# Patient Record
Sex: FEMALE | HISPANIC OR LATINO | Employment: FULL TIME | URBAN - METROPOLITAN AREA
[De-identification: names, ages, dates, MRNs, and addresses within clinical notes are randomized per-mention and may not be internally consistent; named-entity substitution may affect disease eponyms.]

---

## 2017-08-28 ENCOUNTER — HOSPITAL ENCOUNTER (INPATIENT)
Facility: MEDICAL CENTER | Age: 37
LOS: 2 days | End: 2017-08-30
Attending: SPECIALIST | Admitting: SPECIALIST
Payer: MEDICAID

## 2017-08-28 LAB
BASOPHILS # BLD AUTO: 0.3 % (ref 0–1.8)
BASOPHILS # BLD: 0.05 K/UL (ref 0–0.12)
EOSINOPHIL # BLD AUTO: 0.07 K/UL (ref 0–0.51)
EOSINOPHIL NFR BLD: 0.4 % (ref 0–6.9)
ERYTHROCYTE [DISTWIDTH] IN BLOOD BY AUTOMATED COUNT: 47.1 FL (ref 35.9–50)
HCT VFR BLD AUTO: 40 % (ref 37–47)
HGB BLD-MCNC: 13.9 G/DL (ref 12–16)
HOLDING TUBE BB 8507: NORMAL
IMM GRANULOCYTES # BLD AUTO: 0.27 K/UL (ref 0–0.11)
IMM GRANULOCYTES NFR BLD AUTO: 1.6 % (ref 0–0.9)
LYMPHOCYTES # BLD AUTO: 2.33 K/UL (ref 1–4.8)
LYMPHOCYTES NFR BLD: 13.8 % (ref 22–41)
MCH RBC QN AUTO: 32.6 PG (ref 27–33)
MCHC RBC AUTO-ENTMCNC: 34.8 G/DL (ref 33.6–35)
MCV RBC AUTO: 93.7 FL (ref 81.4–97.8)
MONOCYTES # BLD AUTO: 0.89 K/UL (ref 0–0.85)
MONOCYTES NFR BLD AUTO: 5.3 % (ref 0–13.4)
NEUTROPHILS # BLD AUTO: 13.23 K/UL (ref 2–7.15)
NEUTROPHILS NFR BLD: 78.6 % (ref 44–72)
NRBC # BLD AUTO: 0.02 K/UL
NRBC BLD AUTO-RTO: 0.1 /100 WBC
PLATELET # BLD AUTO: 218 K/UL (ref 164–446)
PMV BLD AUTO: 12.9 FL (ref 9–12.9)
RBC # BLD AUTO: 4.27 M/UL (ref 4.2–5.4)
WBC # BLD AUTO: 16.8 K/UL (ref 4.8–10.8)

## 2017-08-28 PROCEDURE — 59025 FETAL NON-STRESS TEST: CPT | Performed by: SPECIALIST

## 2017-08-28 PROCEDURE — 0HQ9XZZ REPAIR PERINEUM SKIN, EXTERNAL APPROACH: ICD-10-PCS | Performed by: SPECIALIST

## 2017-08-28 PROCEDURE — 10907ZC DRAINAGE OF AMNIOTIC FLUID, THERAPEUTIC FROM PRODUCTS OF CONCEPTION, VIA NATURAL OR ARTIFICIAL OPENING: ICD-10-PCS | Performed by: SPECIALIST

## 2017-08-28 PROCEDURE — 700111 HCHG RX REV CODE 636 W/ 250 OVERRIDE (IP): Performed by: SPECIALIST

## 2017-08-28 PROCEDURE — 59409 OBSTETRICAL CARE: CPT

## 2017-08-28 PROCEDURE — 85025 COMPLETE CBC W/AUTO DIFF WBC: CPT

## 2017-08-28 PROCEDURE — 700105 HCHG RX REV CODE 258: Performed by: SPECIALIST

## 2017-08-28 PROCEDURE — 304965 HCHG RECOVERY SERVICES

## 2017-08-28 PROCEDURE — 36415 COLL VENOUS BLD VENIPUNCTURE: CPT

## 2017-08-28 PROCEDURE — 700111 HCHG RX REV CODE 636 W/ 250 OVERRIDE (IP)

## 2017-08-28 PROCEDURE — 4A1HX4Z MONITORING OF PRODUCTS OF CONCEPTION, CARDIAC ELECTRICAL ACTIVITY, EXTERNAL APPROACH: ICD-10-PCS | Performed by: SPECIALIST

## 2017-08-28 PROCEDURE — 770002 HCHG ROOM/CARE - OB PRIVATE (112)

## 2017-08-28 RX ORDER — MISOPROSTOL 200 UG/1
600 TABLET ORAL
Status: DISCONTINUED | OUTPATIENT
Start: 2017-08-28 | End: 2017-08-30 | Stop reason: HOSPADM

## 2017-08-28 RX ORDER — OXYCODONE HYDROCHLORIDE 5 MG/1
5 TABLET ORAL EVERY 4 HOURS PRN
Status: DISCONTINUED | OUTPATIENT
Start: 2017-08-28 | End: 2017-08-30 | Stop reason: HOSPADM

## 2017-08-28 RX ORDER — BISACODYL 10 MG
10 SUPPOSITORY, RECTAL RECTAL PRN
Status: DISCONTINUED | OUTPATIENT
Start: 2017-08-28 | End: 2017-08-30 | Stop reason: HOSPADM

## 2017-08-28 RX ORDER — DOCUSATE SODIUM 100 MG/1
100 CAPSULE, LIQUID FILLED ORAL 2 TIMES DAILY PRN
Status: DISCONTINUED | OUTPATIENT
Start: 2017-08-28 | End: 2017-08-30 | Stop reason: HOSPADM

## 2017-08-28 RX ORDER — SODIUM CHLORIDE, SODIUM LACTATE, POTASSIUM CHLORIDE, CALCIUM CHLORIDE 600; 310; 30; 20 MG/100ML; MG/100ML; MG/100ML; MG/100ML
INJECTION, SOLUTION INTRAVENOUS PRN
Status: DISCONTINUED | OUTPATIENT
Start: 2017-08-28 | End: 2017-08-30 | Stop reason: HOSPADM

## 2017-08-28 RX ORDER — SODIUM CHLORIDE, SODIUM LACTATE, POTASSIUM CHLORIDE, CALCIUM CHLORIDE 600; 310; 30; 20 MG/100ML; MG/100ML; MG/100ML; MG/100ML
INJECTION, SOLUTION INTRAVENOUS CONTINUOUS
Status: DISCONTINUED | OUTPATIENT
Start: 2017-08-28 | End: 2017-08-28

## 2017-08-28 RX ORDER — PENICILLIN G POTASSIUM 5000000 [IU]/1
INJECTION, POWDER, FOR SOLUTION INTRAMUSCULAR; INTRAVENOUS
Status: COMPLETED
Start: 2017-08-28 | End: 2017-08-28

## 2017-08-28 RX ORDER — VITAMIN A ACETATE, BETA CAROTENE, ASCORBIC ACID, CHOLECALCIFEROL, .ALPHA.-TOCOPHEROL ACETATE, DL-, THIAMINE MONONITRATE, RIBOFLAVIN, NIACINAMIDE, PYRIDOXINE HYDROCHLORIDE, FOLIC ACID, CYANOCOBALAMIN, CALCIUM CARBONATE, FERROUS FUMARATE, ZINC OXIDE, CUPRIC OXIDE 3080; 12; 120; 400; 1; 1.84; 3; 20; 22; 920; 25; 200; 27; 10; 2 [IU]/1; UG/1; MG/1; [IU]/1; MG/1; MG/1; MG/1; MG/1; MG/1; [IU]/1; MG/1; MG/1; MG/1; MG/1; MG/1
1 TABLET, FILM COATED ORAL EVERY MORNING
Status: DISCONTINUED | OUTPATIENT
Start: 2017-08-29 | End: 2017-08-30 | Stop reason: HOSPADM

## 2017-08-28 RX ORDER — OXYCODONE HYDROCHLORIDE 10 MG/1
10 TABLET ORAL EVERY 4 HOURS PRN
Status: DISCONTINUED | OUTPATIENT
Start: 2017-08-28 | End: 2017-08-30 | Stop reason: HOSPADM

## 2017-08-28 RX ORDER — MISOPROSTOL 200 UG/1
800 TABLET ORAL
Status: DISCONTINUED | OUTPATIENT
Start: 2017-08-28 | End: 2017-08-28 | Stop reason: HOSPADM

## 2017-08-28 RX ADMIN — Medication 20 UNITS: at 15:33

## 2017-08-28 RX ADMIN — PENICILLIN G POTASSIUM 5 MILLION UNITS: 5000000 POWDER, FOR SOLUTION INTRAMUSCULAR; INTRAPLEURAL; INTRATHECAL; INTRAVENOUS at 12:47

## 2017-08-28 ASSESSMENT — PAIN SCALES - GENERAL
PAINLEVEL_OUTOF10: 2
PAINLEVEL_OUTOF10: 2

## 2017-08-28 ASSESSMENT — LIFESTYLE VARIABLES
EVER_SMOKED: NEVER
ALCOHOL_USE: NO

## 2017-08-28 NOTE — OR SURGEON
Operative Report        Estimated Blood Loss: 300ccs    Findings:  over first degree mll without any nuchal cord with easy delivery of the shoulders and body with the placenta spontaneous and intact with 3vc with Apgars of 8/9 at one and five min respectively. Repair done with 3.0Vicryl with single interruted sutures.     Complications: none        2017 2:20 PM Dakotah Mauricio

## 2017-08-28 NOTE — PROGRESS NOTES
0845 - Patient of Dr. Mauricio presents with complaints of contractions. [place] Gestation today at 38.3 weeks    Reports contractions started yesterday and progressively became more intense and regular today. Denies problems with Pregnancy, denies ROM, reports some spotting of blood this am. Denies change to vision/edema/HA, Reports FM. FM/TOCO use discussed and placed, POC discussed. FOB and patients mother at BS. SVE at 3-4/70/high, states she was 1 cm in the office on Thursday. Patient encouraged to call RN with all questions concerns needs prn.    0901 - EFM's removed, patient with treaded socks ambulating halls with FOB and her mother. Plan to reassess cervix in 1 hour.     1005 - Patient has completed one hour of ambulation, reports her contractions may be more frequent, some are harder than others. Reports a large amount of red mucous on return to triage. SVE at 4/70/-3 with scant amount red blood and mucous noted on exam glove.     1045 - Message left for Dr. Mauricio to return call, non urgent message left on physicians cell phone.     1115 - Report to Dr. Mauricio regarding patient arrival/complaint status. Orders received for admission. POC discussed with patient/family. Update to L&D charge MARY CARMEN ALSTON RN.   1120 - BS report to Dottie MEDRANO RN

## 2017-08-28 NOTE — H&P
DATE OF ADMISSION:  2017    REASON FOR ADMISSION:  Active labor.    HISTORY OF PRESENT ILLNESS:  This is a 37-year-old  5, para 4 with   overall reassuring fetal status, who presented in active labor, had changed   from 3-4 cm, 70%, and 0 station.  Overall, reassuring fetal status was   appreciated.  She is group B strep negative and we will plan to proceed   forward with admission as planned.    PAST MEDICAL HISTORY:  Migraine headaches, otherwise unremarkable.    PAST SURGICAL HISTORY:  None.    OBSTETRICAL HISTORY:  In ,  and in , the patient had a spontaneous   vaginal deliveries; in , this was twin pregnancies.    GYNECOLOGIC HISTORY:  Unremarkable.    SOCIAL HISTORY:  She denies use of any alcohol, tobacco, or recreational drug   use.    MEDICATIONS:  Prenatal vitamins.    ALLERGIES:  No known drug allergies.    PHYSICAL EXAMINATION:  VITAL SIGNS:  She is afebrile, hemodynamically stable.  HEART:  Regular rate and rhythm.  CHEST:  Clear to auscultation bilaterally.  ABDOMEN:  Soft, gravid, nontender.  PELVIC:  Sterile vaginal exam is as stated above.  EXTREMITIES:  Nontender.    Prenatal care labs are all in order.    ASSESSMENT AND PLAN:  A 37-year-old  4, para 3, at 38-3/7 weeks   gestation, admitted in active labor with overall reassuring fetal status.  We   will plan to proceed forward with admission and artificial rupture of   membranes.  She is interested in proceeding forward with continuous lumbar   epidural to optimize pain management expected, anticipate spontaneous vaginal   delivery.       ____________________________________     MD JEREMY ODELL / DENI    DD:  2017 11:44:17  DT:  2017 12:05:36    D#:  [id number]  Job#:  [id number]

## 2017-08-28 NOTE — PROGRESS NOTES
@1600 Pt up to bathroom with standby assist - steady gait.  Pericare done, pads applied.  Pt to PP @1615 via w/c with all personal belongings.  Report to pp RN. pt. care cont.

## 2017-08-29 LAB
ERYTHROCYTE [DISTWIDTH] IN BLOOD BY AUTOMATED COUNT: 47.8 FL (ref 35.9–50)
HCT VFR BLD AUTO: 34.7 % (ref 37–47)
HGB BLD-MCNC: 12.1 G/DL (ref 12–16)
MCH RBC QN AUTO: 32.8 PG (ref 27–33)
MCHC RBC AUTO-ENTMCNC: 34.9 G/DL (ref 33.6–35)
MCV RBC AUTO: 94 FL (ref 81.4–97.8)
PLATELET # BLD AUTO: 176 K/UL (ref 164–446)
PMV BLD AUTO: 12.3 FL (ref 9–12.9)
RBC # BLD AUTO: 3.69 M/UL (ref 4.2–5.4)
WBC # BLD AUTO: 19.5 K/UL (ref 4.8–10.8)

## 2017-08-29 PROCEDURE — A9270 NON-COVERED ITEM OR SERVICE: HCPCS | Performed by: SPECIALIST

## 2017-08-29 PROCEDURE — 770002 HCHG ROOM/CARE - OB PRIVATE (112)

## 2017-08-29 PROCEDURE — 85027 COMPLETE CBC AUTOMATED: CPT

## 2017-08-29 PROCEDURE — 36415 COLL VENOUS BLD VENIPUNCTURE: CPT

## 2017-08-29 PROCEDURE — 700112 HCHG RX REV CODE 229: Performed by: SPECIALIST

## 2017-08-29 PROCEDURE — 700102 HCHG RX REV CODE 250 W/ 637 OVERRIDE(OP): Performed by: SPECIALIST

## 2017-08-29 RX ADMIN — DOCUSATE SODIUM 100 MG: 100 CAPSULE ORAL at 09:34

## 2017-08-29 RX ADMIN — Medication 1 TABLET: at 09:34

## 2017-08-29 ASSESSMENT — PAIN SCALES - GENERAL
PAINLEVEL_OUTOF10: 2
PAINLEVEL_OUTOF10: 2
PAINLEVEL_OUTOF10: 0

## 2017-08-29 ASSESSMENT — LIFESTYLE VARIABLES: DO YOU DRINK ALCOHOL: NO

## 2017-08-29 NOTE — CARE PLAN
Problem: Altered physiologic condition related to immediate post-delivery state and potential for bleeding/hemorrhage  Goal: Patient physiologically stable as evidenced by normal lochia, palpable uterine involution and vital signs within normal limits  Outcome: PROGRESSING AS EXPECTED  Fundus firm @ U, lochia rubra minimal. V/S within defined limits.    Problem: Alteration in comfort related to episiotomy, vaginal repair and/or after birth pains  Goal: Patient verbalizes acceptable pain level  Outcome: PROGRESSING AS EXPECTED  Patient verbalized acceptable pain level @ this time. Will be medicated as needed.

## 2017-08-29 NOTE — PROGRESS NOTES
Pt received to room 340. Report received from L&D RN. Pt oriented to room, call light, infant security, emergency light, visiting hours and unit routine. Plan of care discussed encouraged to call with needs.

## 2017-08-29 NOTE — PROGRESS NOTES
Bedside report done, patient in bed with on going IVF of LR with 20 Units of Pitocin infusing well @ 125 ml/hr., denies pain @ this time. Will continue to monitor.

## 2017-08-29 NOTE — DELIVERY NOTE
DATE OF SERVICE:  2017    HISTORY:  Briefly, this is a 37-year-old  4, para 3, at term who   presented in active labor with group B strep positivity.      She did receive 1 dose of penicillin, did have an artificial rupture of   membranes with clear amniotic fluid noted.  Rapidly progressed to 8 cm, pushed   for a short period of time after having the urge to push with anterior _____   deliver spontaneous vaginal delivery over a first-degree midline laceration   without any nuchal cord with easy delivery of the shoulders and body.  Cord   was clamped and cut.  Infant was handed to waiting nursing staff.  Apgars were   8 and 9 at 1 and 5 minutes respectively.  Placenta was spontaneously   delivered intact with a 3-vessel cord confirmed.  Repair of the midline   laceration was done with 3-0 Vicryl single interrupted sutures.  Patient   tolerated labor and delivery and repair well.  She did have estimated blood   loss of 300 mL for the delivery.       ____________________________________     MD JEREMY ODELL / DENI    DD:  2017 14:26:49  DT:  2017 16:51:01    D#:  [id number]  Job#:  [id number]

## 2017-08-30 VITALS
HEIGHT: 60 IN | SYSTOLIC BLOOD PRESSURE: 131 MMHG | OXYGEN SATURATION: 97 % | RESPIRATION RATE: 16 BRPM | HEART RATE: 65 BPM | WEIGHT: 147 LBS | TEMPERATURE: 98.4 F | BODY MASS INDEX: 28.86 KG/M2 | DIASTOLIC BLOOD PRESSURE: 88 MMHG

## 2017-08-30 PROCEDURE — 700102 HCHG RX REV CODE 250 W/ 637 OVERRIDE(OP): Performed by: SPECIALIST

## 2017-08-30 PROCEDURE — A9270 NON-COVERED ITEM OR SERVICE: HCPCS | Performed by: SPECIALIST

## 2017-08-30 PROCEDURE — 700112 HCHG RX REV CODE 229: Performed by: SPECIALIST

## 2017-08-30 RX ADMIN — Medication 1 TABLET: at 10:07

## 2017-08-30 RX ADMIN — DOCUSATE SODIUM 100 MG: 100 CAPSULE ORAL at 10:07

## 2017-08-30 ASSESSMENT — PAIN SCALES - GENERAL
PAINLEVEL_OUTOF10: 0

## 2017-08-30 NOTE — PROGRESS NOTES
0700-Report received from India Rosales RN. Patient denies any pain and states she will ask for pain medication.  0930-Assessment done. Infant is rooming in. Patient denies any needs. Asked patient to call when she breast feeds to observe a latch.  1600-Bedside report given to Wali Davison RN.

## 2017-08-30 NOTE — CARE PLAN
Problem: Altered physiologic condition related to immediate post-delivery state and potential for bleeding/hemorrhage  Goal: Patient physiologically stable as evidenced by normal lochia, palpable uterine involution and vital signs within normal limits  Outcome: PROGRESSING AS EXPECTED  Fundal massage done with light bleeding    Problem: Alteration in comfort related to episiotomy, vaginal repair and/or after birth pains  Goal: Patient is able to ambulate, care for self and infant  Outcome: PROGRESSING AS EXPECTED  Pain controlled

## 2017-08-30 NOTE — DISCHARGE SUMMARY
DATE OF ADMISSION:  2017    DATE OF DISCHARGE:  2017    DISCHARGE DIAGNOSES:  1.  Status post spontaneous vaginal delivery.  2.  Uncomplicated postpartum course.    HISTORY OF PRESENT ILLNESS:  A 37-year-old  5, para 4 with overall   reassuring fetal status, who presented in active labor, had changed from 3 to   4 cm, had overall reassuring fetal status.  She is group B strep negative.    Plan to proceed forward with admission.    PAST MEDICAL HISTORY AND PHYSICAL EXAMINATION:  Can be found in dictated   history and physical.    ASSESSMENT AND PLAN:  A 37-year-old  4, para 3, at term with overall   reassuring fetal status, now admitted status post artificial rupture of   membranes.  She was actually found to be group B strep positive and was   started on antibiotics per protocol.    HOSPITAL COURSE:  As stated above, the patient did undergo a spontaneous   vaginal delivery with an estimated blood loss of 300 mL, Apgars were 8 and 9   at 1 and 5 minutes respectively.  Her postpartum course was unremarkable.  She   was ambulating and voiding well, tolerating a regular diet.  Her pain is well   controlled.  She was breastfeeding well.  She was felt to be appropriate for   discharge on postpartum day #2.    DISCHARGE INSTRUCTIONS:  She is to call if any increased temperature greater   than 100.4, increasing vaginal bleeding, abdominal pain unrelieved with any   p.o. pain medication or call with any other questions or concerns.       ____________________________________     MD JEREMY ODELL / DENI    DD:  2017 07:58:20  DT:  2017 08:06:44    D#:  [id number]  Job#:  [id number]

## 2017-08-30 NOTE — PROGRESS NOTES
Progress Note    Subjective:   Doing well. No issues or concerns. Pain well controlled. BF well.    Objective Data:  Recent Labs      17   1200  17   0347   WBC  16.8*  19.5*   RBC  4.27  3.69*   HEMOGLOBIN  13.9  12.1   HEMATOCRIT  40.0  34.7*   MCV  93.7  94.0   MCH  32.6  32.8   MCHC  34.8  34.9   RDW  47.1  47.8   PLATELETCT  218  176   MPV  12.9  12.3       abdomen: soft non tender fundus  Peirneum: no sig bleeding or discharge  Ext:n on tender calves    Vitals:    17 2000 17 0000 17 0800 17   BP: 139/84 135/84 129/85 125/80   Pulse: 72 72 69 73   Resp: 16    Temp: 37.4 °C (99.3 °F) 37.2 °C (98.9 °F) 36.7 °C (98 °F) 36.7 °C (98 °F)   TempSrc:       SpO2: 96% 97% 93% 94%   Weight:       Height:           No intake or output data in the 24 hours ending 17 0754    Current Facility-Administered Medications   Medication Dose Route Frequency Provider Last Rate Last Dose   • oxycodone immediate-release (ROXICODONE) tablet 5 mg  5 mg Oral Q4HRS PRN Dakotah Mauricio M.D.       • oxycodone immediate release (ROXICODONE) tablet 10 mg  10 mg Oral Q4HRS PRN Dakotah Mauricio M.D.       • LR infusion   Intravenous PRN Dakotah Mauricio M.D.       • misoprostol (CYTOTEC) tablet 600 mcg  600 mcg Rectal Once PRN Dakotah Mauricio M.D.       • docusate sodium (COLACE) capsule 100 mg  100 mg Oral BID PRN Dakotah Mauricio M.D.   100 mg at 17 0934   • bisacodyl (DULCOLAX) suppository 10 mg  10 mg Rectal PRN Dakotah Mauricio M.D.       • prenatal plus vitamin (STUARTNATAL 1+1) 27-1 MG tablet 1 Tab  1 Tab Oral QAM Dakotah Mauricio M.D.   1 Tab at 17 0934       A/P S/P . Doing well. No issues or concerns and wishes to be discharged home today. All questions were answered.

## 2017-08-30 NOTE — CARE PLAN
Problem: Altered physiologic condition related to immediate post-delivery state and potential for bleeding/hemorrhage  Goal: Patient physiologically stable as evidenced by normal lochia, palpable uterine involution and vital signs within normal limits  Outcome: PROGRESSING AS EXPECTED  Fundus is firm one cm below umbilicus midline and lochia is light rubra.    Problem: Potential for postpartum infection related to presence of episiotomy/vaginal tear and/or uterine contamination  Goal: Patient will be absent from signs and symptoms of infection  Outcome: PROGRESSING AS EXPECTED  Patient has been afebrile. Patient instructed to use marquise bottle after each void or stool.

## 2017-08-30 NOTE — DISCHARGE INSTRUCTIONS
POSTPARTUM DISCHARGE INSTRUCTIONS FOR MOM    YOB: 1980   Age: 37 y.o.               Admit Date: 8/28/2017     Discharge Date: 8/30/2017  Attending Doctor:  Dakotah Mauricio M.D.                  Allergies:  Review of patient's allergies indicates no known allergies.    Discharged to home by car. Discharged via wheelchair, hospital escort: Yes.  Special equipment needed: Not Applicable  Belongings with: Personal  Be sure to schedule a follow-up appointment with your primary care doctor or any specialists as instructed.     Discharge Plan:   Diet Plan: Discussed  Activity Level: Discussed  Confirmed Follow up Appointment: Patient to Call and Schedule Appointment  Confirmed Symptoms Management: Discussed  Medication Reconciliation Updated: Yes  Influenza Vaccine Indication: Indicated: 9 to 64 years of age    REASONS TO CALL YOUR OBSTETRICIAN:  1.   Persistent fever or shaking chills (Temperature higher than 100.4)  2.   Heavy bleeding (soaking more than 1 pad per hour); Passing clots  3.   Foul odor from vagina  4.   Mastitis (Breast infection; breast pain, chills, fever, redness)  5.   Urinary pain, burning or frequency  6.   Episiotomy infection  7.   Severe depression longer than 24 hours    HAND WASHING  · Prior to handling the baby.  · Before breastfeeding or bottle feeding baby.  · After using the bathroom or changing the baby's diaper.    VAGINAL CARE  · Nothing inside vagina for 6 weeks: no sexual intercourse, tampons or douching.  · Bleeding may continue for 2-4 weeks.  Amount may vary.    · Call your physician for heavy bleeding which means soaking more than 1 pad per hour    BIRTH CONTROL  · It is possible to become pregnant at any time after delivery and while breastfeeding.  · Plan to discuss a method of birth control with your physician at your follow up visit. visit.    DIET AND ELIMINATION  · Eating more fiber (bran cereal, fruits, and vegetables) and drinking plenty of fluids will help to  "avoid constipation.  · Urinary frequency after childbirth is normal.    POSTPARTUM BLUES  During the first few days after birth, you may experience a sense of the \"blues\" which may include impatience, irritability or even crying.  These feeling come and go quickly.  However, as many as 1 in 10 women experience emotional symptoms known as postpartum depression.    Postpartum depression:  May start as early as the second or third day after delivery or take several weeks or months to develop.  Symptoms of \"blues\" are present, but are more intense:  Crying spells; loss of appetite; feelings of hopelessness or loss of control; fear of touching the baby; over concern or no concern at all about the baby; little or no concern about your own appearance/caring for yourself; and/or inability to sleep or excessive sleeping.  Contact your physician if you are experiencing any of these symptoms.    Crisis Hotline:  · [place] Crisis Hotline:  9-193-CFDXBYN  Or 1-509.269.1654  · [place] Crisis Hotline:  1-314.864.6832  Or 919-463-4939    PREVENTING SHAKEN BABY:  If you are angry or stressed, PUT THE BABY IN THE CRIB, step away, take some deep breaths, and wait until you are calm to care for the baby.  DO NOT SHAKE THE BABY.  You are not alone, call a supporter for help.    · Crisis Call Center 24/7 crisis line 852-519-9969 or 1-862.264.1101  · You can also text them, text \"ANSWER\" to 654235    QUIT SMOKING/TOBACCO USE:  I understand the use of any tobacco products increases my chance of suffering from future heart disease and could cause other illnesses which may shorten my life. Quitting the use of tobacco products is the single most important thing I can do to improve my health. For further information on smoking / tobacco cessation call a Toll Free Quit Line at 1-843.494.8152 (*[employer] [place]) or 1-426.827.9028 (American Lung Association) or you can access the web based program at www.lungusa.org.    · [place] Tobacco " Users Help Line:  (620) 513-2024       Toll Free: 1-189.240.7817  · Quit Tobacco Program [facility] (950)708-3101    DEPRESSION / SUICIDE RISK:  As you are discharged from this [facility], it is important to learn how to keep safe from harming yourself.    Recognize the warning signs:  · Abrupt changes in personality, positive or negative- including increase in energy   · Giving away possessions  · Change in eating patterns- significant weight changes-  positive or negative  · Change in sleeping patterns- unable to sleep or sleeping all the time   · Unwillingness or inability to communicate  · Depression  · Unusual sadness, discouragement and loneliness  · Talk of wanting to die  · Neglect of personal appearance   · Rebelliousness- reckless behavior  · Withdrawal from people/activities they love  · Confusion- inability to concentrate     If you or a loved one observes any of these behaviors or has concerns about self-harm, here's what you can do:  · Talk about it- your feelings and reasons for harming yourself  · Remove any means that you might use to hurt yourself (examples: pills, rope, extension cords, firearm)  · Get professional help from the community (Mental Health, Substance Abuse, psychological counseling)  · Do not be alone:Call your Safe Contact- someone whom you trust who will be there for you.  · Call your local CRISIS HOTLINE 844-8961 or 684-196-1650  · Call your local Children's Mobile Crisis Response Team [place] (328) 159-3494 or www.[employer]  · Call the toll free National Suicide Prevention Hotlines   · National Suicide Prevention Lifeline 921-170-ZYFX (0810)  · National Hope Line Network 800-SUICIDE (342-4616)    DISCHARGE SURVEY:  Thank you for choosing [facility].  We hope we provided you with very good care.  You may be receiving a survey in the mail.  Please fill it out.  Your opinion is valuable to us.    ADDITIONAL EDUCATIONAL MATERIALS  GIVEN TO PATIENT:        My signature on this form indicates that:  1.  I have reviewed and understand the above information  2.  My questions regarding this information have been answered to my satisfaction.  3.  I have formulated a plan with my discharge nurse to obtain my prescribed medication for home.

## 2017-08-31 NOTE — PROGRESS NOTES
Patient education and discharge instructions reviewed with patient by Marybeth Izquierdo RN. with stated understanding by patient.  Patient states all questions have been answered and denies any problems.  Patient discharged to home in stable condition with all belongings.

## 2019-05-21 NOTE — PROGRESS NOTES
0700-Bedside report received from Estephania Mendoza RN. Assumed care of patient. Patient denies any pain and states she will ask for pain medication as needed. Infant is rooming in. Patient states she will ask for pain medication as needed.   [Negative] : Genitourinary